# Patient Record
Sex: MALE | Race: OTHER | URBAN - METROPOLITAN AREA
[De-identification: names, ages, dates, MRNs, and addresses within clinical notes are randomized per-mention and may not be internally consistent; named-entity substitution may affect disease eponyms.]

---

## 2024-08-23 ENCOUNTER — LAB ENCOUNTER (OUTPATIENT)
Dept: LAB | Age: 34
End: 2024-08-23
Attending: NURSE PRACTITIONER
Payer: COMMERCIAL

## 2024-08-23 ENCOUNTER — PATIENT MESSAGE (OUTPATIENT)
Dept: FAMILY MEDICINE CLINIC | Facility: CLINIC | Age: 34
End: 2024-08-23

## 2024-08-23 ENCOUNTER — OFFICE VISIT (OUTPATIENT)
Dept: FAMILY MEDICINE CLINIC | Facility: CLINIC | Age: 34
End: 2024-08-23

## 2024-08-23 VITALS
DIASTOLIC BLOOD PRESSURE: 84 MMHG | HEIGHT: 67 IN | BODY MASS INDEX: 30.45 KG/M2 | WEIGHT: 194 LBS | HEART RATE: 63 BPM | SYSTOLIC BLOOD PRESSURE: 124 MMHG

## 2024-08-23 DIAGNOSIS — M26.24: ICD-10-CM

## 2024-08-23 DIAGNOSIS — Z00.00 WELL ADULT EXAM: ICD-10-CM

## 2024-08-23 DIAGNOSIS — M26.9: ICD-10-CM

## 2024-08-23 DIAGNOSIS — Z00.00 WELL ADULT EXAM: Primary | ICD-10-CM

## 2024-08-23 DIAGNOSIS — Z23 IMMUNIZATION DUE: ICD-10-CM

## 2024-08-23 DIAGNOSIS — M26.220 ANTERIOR OPEN BITE: ICD-10-CM

## 2024-08-23 DIAGNOSIS — M26.220 ANTERIOR OPEN BITE: Primary | ICD-10-CM

## 2024-08-23 LAB
ALBUMIN SERPL-MCNC: 5 G/DL (ref 3.2–4.8)
ALBUMIN/GLOB SERPL: 1.7 {RATIO} (ref 1–2)
ALP LIVER SERPL-CCNC: 76 U/L
ALT SERPL-CCNC: 36 U/L
ANION GAP SERPL CALC-SCNC: 6 MMOL/L (ref 0–18)
AST SERPL-CCNC: 25 U/L (ref ?–34)
BILIRUB SERPL-MCNC: 0.9 MG/DL (ref 0.3–1.2)
BUN BLD-MCNC: 18 MG/DL (ref 9–23)
BUN/CREAT SERPL: 15.8 (ref 10–20)
CALCIUM BLD-MCNC: 10 MG/DL (ref 8.7–10.4)
CHLORIDE SERPL-SCNC: 107 MMOL/L (ref 98–112)
CHOLEST SERPL-MCNC: 155 MG/DL (ref ?–200)
CO2 SERPL-SCNC: 29 MMOL/L (ref 21–32)
CREAT BLD-MCNC: 1.14 MG/DL
DEPRECATED RDW RBC AUTO: 38.3 FL (ref 35.1–46.3)
EGFRCR SERPLBLD CKD-EPI 2021: 85 ML/MIN/1.73M2 (ref 60–?)
ERYTHROCYTE [DISTWIDTH] IN BLOOD BY AUTOMATED COUNT: 12.1 % (ref 11–15)
EST. AVERAGE GLUCOSE BLD GHB EST-MCNC: 111 MG/DL (ref 68–126)
FASTING PATIENT LIPID ANSWER: YES
FASTING STATUS PATIENT QL REPORTED: YES
GLOBULIN PLAS-MCNC: 2.9 G/DL (ref 2–3.5)
GLUCOSE BLD-MCNC: 93 MG/DL (ref 70–99)
HBA1C MFR BLD: 5.5 % (ref ?–5.7)
HCT VFR BLD AUTO: 43.3 %
HDLC SERPL-MCNC: 46 MG/DL (ref 40–59)
HGB BLD-MCNC: 15.9 G/DL
LDLC SERPL CALC-MCNC: 95 MG/DL (ref ?–100)
MCH RBC QN AUTO: 31.9 PG (ref 26–34)
MCHC RBC AUTO-ENTMCNC: 36.7 G/DL (ref 31–37)
MCV RBC AUTO: 86.9 FL
NONHDLC SERPL-MCNC: 109 MG/DL (ref ?–130)
OSMOLALITY SERPL CALC.SUM OF ELEC: 296 MOSM/KG (ref 275–295)
PLATELET # BLD AUTO: 307 10(3)UL (ref 150–450)
POTASSIUM SERPL-SCNC: 4.5 MMOL/L (ref 3.5–5.1)
PROT SERPL-MCNC: 7.9 G/DL (ref 5.7–8.2)
RBC # BLD AUTO: 4.98 X10(6)UL
SODIUM SERPL-SCNC: 142 MMOL/L (ref 136–145)
TRIGL SERPL-MCNC: 72 MG/DL (ref 30–149)
TSI SER-ACNC: 2.3 MIU/ML (ref 0.55–4.78)
VIT B12 SERPL-MCNC: 363 PG/ML (ref 211–911)
VIT D+METAB SERPL-MCNC: 28.4 NG/ML (ref 30–100)
VLDLC SERPL CALC-MCNC: 12 MG/DL (ref 0–30)
WBC # BLD AUTO: 9 X10(3) UL (ref 4–11)

## 2024-08-23 PROCEDURE — 80053 COMPREHEN METABOLIC PANEL: CPT

## 2024-08-23 PROCEDURE — 90471 IMMUNIZATION ADMIN: CPT | Performed by: NURSE PRACTITIONER

## 2024-08-23 PROCEDURE — 85027 COMPLETE CBC AUTOMATED: CPT

## 2024-08-23 PROCEDURE — 83036 HEMOGLOBIN GLYCOSYLATED A1C: CPT

## 2024-08-23 PROCEDURE — 90715 TDAP VACCINE 7 YRS/> IM: CPT | Performed by: NURSE PRACTITIONER

## 2024-08-23 PROCEDURE — 82607 VITAMIN B-12: CPT

## 2024-08-23 PROCEDURE — 99385 PREV VISIT NEW AGE 18-39: CPT | Performed by: NURSE PRACTITIONER

## 2024-08-23 PROCEDURE — 84443 ASSAY THYROID STIM HORMONE: CPT

## 2024-08-23 PROCEDURE — 3074F SYST BP LT 130 MM HG: CPT | Performed by: NURSE PRACTITIONER

## 2024-08-23 PROCEDURE — 36415 COLL VENOUS BLD VENIPUNCTURE: CPT

## 2024-08-23 PROCEDURE — 82306 VITAMIN D 25 HYDROXY: CPT

## 2024-08-23 PROCEDURE — 3079F DIAST BP 80-89 MM HG: CPT | Performed by: NURSE PRACTITIONER

## 2024-08-23 PROCEDURE — 80061 LIPID PANEL: CPT

## 2024-08-23 PROCEDURE — 3008F BODY MASS INDEX DOCD: CPT | Performed by: NURSE PRACTITIONER

## 2024-08-23 NOTE — ASSESSMENT & PLAN NOTE
Screening labs  Please aim to eat a diet high in fresh fruits and vegetables, lean protein sources, complex carbohydrates and limited processed and fast foods.  Try to get at least 150 minutes of exercise per week-a combination of weight resistance and cardio is preferred.    Tdap today

## 2024-08-23 NOTE — PROGRESS NOTES
HPI  Pt here for referral for oral maxillary surgeon.   Has been seen at [facility] dental and was told to follow up w [facility] due to extensive work that needs to be done. The specialist he was referred to does not accept his insurance     Report states- Class III skeletal deformity, anterior open bite, post cross bite, extraction of third molars 16,17,32, and extreactio nf all first pre molars and need for [place] 2 piect advancement with possible BSSO    Has not had physical  in years   No significant health issues    No smoking, vaping, use of cannabis. Will have maybe 5 drinks per week.    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   HENT:  Positive for dental problem. Negative for congestion, ear pain, rhinorrhea, sore throat and trouble swallowing.    Eyes:  Negative for pain, redness and visual disturbance.   Respiratory:  Negative for cough, chest tightness, shortness of breath and wheezing.    Cardiovascular:  Negative for chest pain and palpitations.   Gastrointestinal:  Negative for abdominal distention, abdominal pain, constipation, diarrhea, nausea and vomiting.   Genitourinary:  Negative for difficulty urinating, dysuria and frequency.   Musculoskeletal:  Negative for back pain, gait problem and myalgias.   Skin:  Negative for color change and rash.   Neurological:  Negative for dizziness, weakness and headaches.   Psychiatric/Behavioral:  Positive for sleep disturbance (due to work schedule). Negative for decreased concentration. The patient is not nervous/anxious.        Vitals:    08/23/24 0806 08/23/24 0828   BP: (!) 133/93 124/84   Pulse: 63    Weight: 194 lb (88 kg)    Height: 5' 7\" (1.702 m)      Body mass index  is 30.38 kg/m².  Wt Readings from Last 6 Encounters:   08/23/24 194 lb (88 kg)         Health Maintenance   Topic Date Due    Annual Physical  Never done    DTaP,Tdap,and Td Vaccines (1 - Tdap) Never done    COVID-19 Vaccine (1 - 2023-24 season) Never done    Annual Depression Screening  Never done    Influenza Vaccine (1) 10/01/2024    Pneumococcal Vaccine: Birth to 64yrs  Aged Out       No past medical history on file.    .No past surgical history on file.    No family history on file.    Social History     Socioeconomic History    Marital status:      Spouse name: Not on file    Number of children: Not on file    Years of education: Not on file    Highest education level: Not on file   Occupational History    Not on file   Tobacco Use    Smoking status: Never     Passive exposure: Never    Smokeless tobacco: Never   Vaping Use    Vaping status: Never Used   Substance and Sexual Activity    Alcohol use: Yes    Drug use: Not on file    Sexual activity: Not on file   Other Topics Concern    Not on file   Social History Narrative    Not on file     Social Determinants of Health     Financial Resource Strain: Not on file   Food Insecurity: Not on file   Transportation Needs: Not on file   Physical Activity: Not on file   Stress: Not on file   Social Connections: Not on file   Housing Stability: Not on file       No current outpatient medications on file.       Allergies:  No Known Allergies    Physical Exam  Vitals and nursing note reviewed.   Constitutional:       General: He is not in acute distress.     Appearance: He is well-developed.   HENT:      Head: Normocephalic and atraumatic.      Right Ear: Tympanic membrane, ear canal and external ear normal.      Left Ear: Tympanic membrane, ear canal and external ear normal.      Nose: Nose normal. No congestion or rhinorrhea.      Mouth/Throat:      Mouth: Mucous membranes are moist.      Dentition: Abnormal dentition.      Pharynx: Oropharynx is clear.       Comments: Jaw misalignment  Eyes:      General:         Right eye: No discharge.         Left eye: No discharge.      Conjunctiva/sclera: Conjunctivae normal.      Pupils: Pupils are equal, round, and reactive to light.   Neck:      Thyroid: No thyromegaly.   Cardiovascular:      Rate and Rhythm: Normal rate and regular rhythm.      Heart sounds: Normal heart sounds. No murmur heard.  Pulmonary:      Effort: Pulmonary effort is normal. No respiratory distress.      Breath sounds: Normal breath sounds. No wheezing or rales.   Chest:      Chest wall: No tenderness.   Abdominal:      General: Bowel sounds are normal. There is no distension.      Palpations: Abdomen is soft.      Tenderness: There is no abdominal tenderness.   Musculoskeletal:         General: No tenderness. Normal range of motion.      Cervical back: Normal range of motion and neck supple.   Lymphadenopathy:      Cervical: No cervical adenopathy.   Skin:     General: Skin is warm and dry.      Findings: No rash.   Neurological:      Mental Status: He is alert and oriented to person, place, and time.      Coordination: Coordination normal.      Gait: Gait normal.   Psychiatric:         Behavior: Behavior normal.         Thought Content: Thought content normal.         Judgment: Judgment normal.       Oral surgeons report reviewed w pt  Assessment and Plan:   Problem List Items Addressed This Visit       Anterior open bite    Relevant Orders    Oral Surgery Referral - In Network    Cross bite    Relevant Orders    Oral Surgery Referral - In Network    Immunization due     Tdap today         Relevant Orders    TETANUS, DIPHTHERIA TOXOIDS AND ACELLULAR PERTUSIS VACCINE (TDAP), >7 YEARS, IM USE (Completed)    Skeletal deformity of jaw     Referral for oral maxillary surgeon         Relevant Orders    Oral Surgery Referral - In Network    Well adult exam - Primary     Screening labs  Please aim to eat a diet high in fresh fruits and vegetables, lean protein  sources, complex carbohydrates and limited processed and fast foods.  Try to get at least 150 minutes of exercise per week-a combination of weight resistance and cardio is preferred.    Tdap today         Relevant Orders    CBC, Platelet; No Differential    Comp Metabolic Panel (14)    Hemoglobin A1C    Lipid Panel    TSH W Reflex To Free T4    Vitamin B12    Vitamin D               Discussed plan of care with pt and pt is in agreement.All questions answered. Pt to call with questions or concerns.      Encouraged to sign up for My Chart if not already registered.     Note to patient and family:  The 21st Century Cures Act makes medical notes available to patients in the interest of transparency.  However, please be advised that this is a medical document.  It is intended as a peer to peer communication.  It is written in medical language and may contain abbreviations or verbiage that are technical and unfamiliar.  It may appear blunt or direct.  Medical documents are intended to carry relevant information, facts as evident, and the clinical opinion of the practitioner.

## 2024-08-25 NOTE — TELEPHONE ENCOUNTER
From: Mihir Gamez  To: Fadumo Stephenson  Sent: 8/23/2024 1:30 PM CDT  Subject: Referral with Dr Theodore Steele is retired already. I found another doctor at [facility] which is in-network his name is Brenden Cortes. Can you do a referral to this doctor. Thank you

## 2024-09-04 ENCOUNTER — TELEPHONE (OUTPATIENT)
Dept: CASE MANAGEMENT | Age: 34
End: 2024-09-04

## 2024-09-04 DIAGNOSIS — M26.220 ANTERIOR OPEN BITE: Primary | ICD-10-CM

## 2024-09-04 DIAGNOSIS — M26.9: ICD-10-CM

## 2024-09-04 DIAGNOSIS — M26.24: ICD-10-CM

## 2024-09-04 NOTE — TELEPHONE ENCOUNTER
Fadumo Stephenson,     In regards to referral [id number]     January with Dr. Cortes's office states that Dr. Cortes would not be able to help this patient.      Phone [phone]    Please recommend an orthognaphic surgeon.    Thank you

## 2024-09-05 NOTE — TELEPHONE ENCOUNTER
Good Morning,    I can provide a list of In Network Oral Surgeons with the [facility] HMO plan.  However I do not have what the providers subspecialties are.  I might suggest if this is not helpful to reach out to [facility].  They will be able to advise better if this list does not provide what is needed.      Thank you!  Ivone     Name:  MELO BARNES DDS  Office:  [facility] [place]  ORAL SURGERY  [address] [place] [address]  Phone:  [phone]  Fax:  [phone]    Name:  SORAYA ESTRADA DMD  Office:  [facility]  MAXILLOFACIAL SURG  [address] [place] [address]  Phone:  [phone]  Fax:  [phone]    Name:  REA CHRISTENSEN DDS  Office:  [facility] [place]  ORAL SURGERY  [address] [place] [address]  Phone:  [phone]  Fax:  [phone]    Name:  [facility]  ORAL SURGERY  [address] [facility] [place] [address]  Phone:  [phone]  Fax:  [phone]    Name:  NIKKI PRINGLE DDS  Office:  [facility]  ORAL SURGERY  [address] [place] [address]  Phone:  [phone]  Fax:  [phone]    Name:  REA CHRISTENSEN DMD  Office:  [facility] [place]  ORAL SURGERY  [address]  [place] [address]  Phone:  [phone]  Fax:  [phone]    Name:  OMAR MONIQUE DDS  Office:  [facility]  ORAL SURGERY  [address] [facility] [place] [address]  Phone:  [phone]  Fax:  [phone]    Name:  RENEA KANG DDS  Office:  [facility]  ORAL SURGERY  [address]  [place] [address]  Phone:  [phone]  Fax:  [phone]    Name:  AMMY MENDOZA DDS  Office:  [facility]   ORAL SURGERY  [address] [id number]  Phone:  [phone]  Fax:  [phone]    Name:  DONALDO LYNN,  [facility]  Office:  [facility] LYNN [facility]  ORAL SURGERY  [address] [place] [address]  Phone:  [phone]  Fax:  [phone]    Name:  ONUR DENNISON MD  Office:  [facility]  ORAL SURGERY  [address] [place] [facility] [address]  Phone:  [phone]  Fax:  [phone]    Name:  SWATI LEON DDS  Office:  [facility]  ORAL [facility]GERY  MAXILLOFACIAL SURG  [address] [place] [facility] [address]  Phone:  [phone]  Fax:  [phone]    Name:  MELO BARNES MD  Office:  [facility] [place]  ORAL SURGERY  [address] [facility] [facility]  [address]  [place] [address]  Phone:  [phone]  Fax:  [phone]

## 2024-09-05 NOTE — TELEPHONE ENCOUNTER
Pt needs oral maxillary surgeon-do you have any recommended providers that are covered by his insurance?

## 2024-09-05 NOTE — TELEPHONE ENCOUNTER
Please call pt and have him contact this oral maxillary facial surgery group. I have placed a referral for this group that is within network.     [facility]  ORAL SURGERY  [address] [address]  Phone:  [phone]  Fax:  [phone]     Pt to call and set up appointment to discuss treatment